# Patient Record
Sex: FEMALE | Race: BLACK OR AFRICAN AMERICAN | Employment: UNEMPLOYED | URBAN - METROPOLITAN AREA
[De-identification: names, ages, dates, MRNs, and addresses within clinical notes are randomized per-mention and may not be internally consistent; named-entity substitution may affect disease eponyms.]

---

## 2017-09-06 ENCOUNTER — APPOINTMENT (OUTPATIENT)
Dept: LAB | Age: 32
End: 2017-09-06
Attending: INTERNAL MEDICINE
Payer: COMMERCIAL

## 2017-09-06 ENCOUNTER — OFFICE VISIT (OUTPATIENT)
Dept: ENDOCRINOLOGY CLINIC | Facility: CLINIC | Age: 32
End: 2017-09-06

## 2017-09-06 VITALS
BODY MASS INDEX: 39.86 KG/M2 | WEIGHT: 248 LBS | HEIGHT: 66 IN | HEART RATE: 99 BPM | DIASTOLIC BLOOD PRESSURE: 76 MMHG | SYSTOLIC BLOOD PRESSURE: 117 MMHG

## 2017-09-06 DIAGNOSIS — E55.9 VITAMIN D DEFICIENCY: ICD-10-CM

## 2017-09-06 DIAGNOSIS — Z13.29 THYROID DISORDER SCREENING: Primary | ICD-10-CM

## 2017-09-06 DIAGNOSIS — Z13.29 THYROID DISORDER SCREENING: ICD-10-CM

## 2017-09-06 LAB
T4 FREE SERPL-MCNC: 1 NG/DL (ref 0.58–1.64)
TSH SERPL-ACNC: 1.16 UIU/ML (ref 0.45–5.33)

## 2017-09-06 PROCEDURE — 99212 OFFICE O/P EST SF 10 MIN: CPT | Performed by: INTERNAL MEDICINE

## 2017-09-06 PROCEDURE — 84443 ASSAY THYROID STIM HORMONE: CPT

## 2017-09-06 PROCEDURE — 36415 COLL VENOUS BLD VENIPUNCTURE: CPT

## 2017-09-06 PROCEDURE — 99203 OFFICE O/P NEW LOW 30 MIN: CPT | Performed by: INTERNAL MEDICINE

## 2017-09-06 PROCEDURE — 84439 ASSAY OF FREE THYROXINE: CPT

## 2017-09-06 PROCEDURE — 82306 VITAMIN D 25 HYDROXY: CPT

## 2017-09-06 NOTE — H&P
New Patient Evaluation - History and Physical    CONSULT - Reason for Visit:  Evaluation for thyroid function    Requesting Physician: Self referral    CHIEF COMPLAINT:  Patient presents with:  Thyroid Problem       HISTORY OF PRESENT ILLNESS:   Ygnacio Dubin cough  Cardiovascular: Negative for:  chest pain, palpitations, orthopnea  GI: Negative for:  abdominal pain, nausea, vomiting, diarrhea, + constipation, bleeding  Neurology: Negative for: headache, numbness, weakness  Genito-Urinary: Negative for: dysuria

## 2017-09-08 ENCOUNTER — TELEPHONE (OUTPATIENT)
Dept: ENDOCRINOLOGY CLINIC | Facility: CLINIC | Age: 32
End: 2017-09-08

## 2017-09-08 DIAGNOSIS — E55.9 VITAMIN D DEFICIENCY: ICD-10-CM

## 2017-09-08 DIAGNOSIS — R53.83 OTHER FATIGUE: Primary | ICD-10-CM

## 2017-09-08 LAB — 25(OH)D3 SERPL-MCNC: 9.8 NG/ML

## 2017-09-08 RX ORDER — LEVOTHYROXINE SODIUM 0.03 MG/1
25 TABLET ORAL
Qty: 90 TABLET | Refills: 0 | Status: SHIPPED | OUTPATIENT
Start: 2017-09-08

## 2017-09-08 RX ORDER — ERGOCALCIFEROL 1.25 MG/1
CAPSULE ORAL
Qty: 16 CAPSULE | Refills: 0 | Status: SHIPPED | OUTPATIENT
Start: 2017-09-08

## 2017-09-08 NOTE — TELEPHONE ENCOUNTER
Please let the patient know:    1. Vit D is very low at 9, should be around 25 This could be making her feel tired. Start ergocalciferol 50,000 IU q week x 12 weeks, then follow with q month. Please order  She should repeat Vit D in three months.  Please

## 2017-09-08 NOTE — TELEPHONE ENCOUNTER
Called Sofia. Informed her of Dr. Keturah Ryder instructions below. She verbalized her understanding of all instructions. Reviewed that she should take LT4 in the morning.  Fasting, 30 min before breakfast,  from vitamins and supplements by at least

## 2017-09-14 ENCOUNTER — OFFICE VISIT (OUTPATIENT)
Dept: OBGYN CLINIC | Facility: CLINIC | Age: 32
End: 2017-09-14

## 2017-09-14 VITALS
SYSTOLIC BLOOD PRESSURE: 126 MMHG | WEIGHT: 244.63 LBS | BODY MASS INDEX: 39.31 KG/M2 | DIASTOLIC BLOOD PRESSURE: 74 MMHG | HEIGHT: 66 IN

## 2017-09-14 DIAGNOSIS — N92.0 MENORRHAGIA WITH REGULAR CYCLE: ICD-10-CM

## 2017-09-14 DIAGNOSIS — Z01.419 ENCOUNTER FOR GYNECOLOGICAL EXAMINATION WITHOUT ABNORMAL FINDING: ICD-10-CM

## 2017-09-14 DIAGNOSIS — Z01.419 WELL WOMAN EXAM WITH ROUTINE GYNECOLOGICAL EXAM: Primary | ICD-10-CM

## 2017-09-14 PROCEDURE — 99385 PREV VISIT NEW AGE 18-39: CPT | Performed by: OBSTETRICS & GYNECOLOGY

## 2017-09-14 RX ORDER — NORGESTIMATE AND ETHINYL ESTRADIOL 7DAYSX3 28
1 KIT ORAL DAILY
Qty: 1 PACKAGE | Refills: 4 | Status: SHIPPED | OUTPATIENT
Start: 2017-09-14 | End: 2017-10-12

## 2017-09-14 NOTE — PROGRESS NOTES
HPI:    Patient ID: Chino Hernandez is a 33year old female. HPI  Patient is here for routine exam.  New to office. Reports heavy but regular menses. No H/O fibroids per patient. Discussed cycling with OCPs and patient desires treatment.   Medication revi warm and dry. Psychiatric: She has a normal mood and affect. Her behavior is normal. Judgment and thought content normal.   Nursing note and vitals reviewed. Pap Done.            ASSESSMENT/PLAN:   Well woman exam with routine gynecological exam  (primar

## 2017-09-17 LAB — HPV I/H RISK 1 DNA SPEC QL NAA+PROBE: NEGATIVE

## 2021-09-15 ENCOUNTER — HOSPITAL ENCOUNTER (EMERGENCY)
Facility: HOSPITAL | Age: 36
Discharge: HOME | End: 2021-09-15
Attending: EMERGENCY MEDICINE
Payer: MEDICAID

## 2021-09-15 VITALS
WEIGHT: 198 LBS | SYSTOLIC BLOOD PRESSURE: 125 MMHG | HEART RATE: 76 BPM | RESPIRATION RATE: 16 BRPM | TEMPERATURE: 98 F | HEIGHT: 66 IN | BODY MASS INDEX: 31.82 KG/M2 | OXYGEN SATURATION: 100 % | DIASTOLIC BLOOD PRESSURE: 82 MMHG

## 2021-09-15 DIAGNOSIS — Z20.822 CLOSE EXPOSURE TO COVID-19 VIRUS: Primary | ICD-10-CM

## 2021-09-15 LAB — SARS-COV-2 RNA RESP QL NAA+PROBE: NOT DETECTED

## 2021-09-15 PROCEDURE — 99283 EMERGENCY DEPT VISIT LOW MDM: CPT

## 2021-09-15 NOTE — ED INITIAL ASSESSMENT (HPI)
Patient to Er from home with request for covid testing. Patient states her boyfriend tested positive today. Patient denies symptoms.

## 2021-09-15 NOTE — ED PROVIDER NOTES
Patient Seen in: [facility] AND [facility] Emergency Department      History   Patient presents with:  Testing    Stated Complaint: Testing    Subjective:   HPI    81-year-old female with history of thyroid disorder, here requesting COVID-19 testing.   Her husban SpO2 100%   BMI 31.96 kg/m²         Physical Exam  Vitals and nursing note reviewed. HENT:      Head: Normocephalic. Mouth/Throat:      Mouth: Mucous membranes are moist.   Eyes:      Extraocular Movements: Extraocular movements intact.    Cardiovasc chills, vomiting, pt expresses understanding and agrees to d/c instructions    EMERGENCY DEPARTMENT MEDICAL DECISION MAKING:  After obtaining the patient's history, performing the physical exam and reviewing the diagnostics, multiple initial diagnoses were

## 2021-09-19 ENCOUNTER — HOSPITAL ENCOUNTER (EMERGENCY)
Facility: HOSPITAL | Age: 36
Discharge: HOME | End: 2021-09-19
Payer: MEDICAID

## 2021-09-19 VITALS
HEART RATE: 76 BPM | OXYGEN SATURATION: 97 % | BODY MASS INDEX: 30.53 KG/M2 | RESPIRATION RATE: 19 BRPM | TEMPERATURE: 99 F | HEIGHT: 66 IN | WEIGHT: 190 LBS | SYSTOLIC BLOOD PRESSURE: 100 MMHG | DIASTOLIC BLOOD PRESSURE: 68 MMHG

## 2021-09-19 DIAGNOSIS — U07.1 COVID-19: Primary | ICD-10-CM

## 2021-09-19 LAB — SARS-COV-2 RNA RESP QL NAA+PROBE: DETECTED

## 2021-09-19 PROCEDURE — 96361 HYDRATE IV INFUSION ADD-ON: CPT

## 2021-09-19 PROCEDURE — 96374 THER/PROPH/DIAG INJ IV PUSH: CPT

## 2021-09-19 PROCEDURE — 99284 EMERGENCY DEPT VISIT MOD MDM: CPT

## 2021-09-19 RX ORDER — KETOROLAC TROMETHAMINE 30 MG/ML
30 INJECTION, SOLUTION INTRAMUSCULAR; INTRAVENOUS ONCE
Status: COMPLETED | OUTPATIENT
Start: 2021-09-19 | End: 2021-09-19

## 2021-09-19 NOTE — ED PROVIDER NOTES
Patient Seen in: [facility] AND [facility] Emergency Department      History   Patient presents with:  Covid-19 Test  Cough/URI    Stated Complaint: COVID symptoms     Subjective:   HPI    44-year-old female presents the emergency department for evaluation.  Yue 0116]   /85   Pulse 99   Resp 20   Temp 98.5 °F (36.9 °C)   Temp src Temporal   SpO2 99 %   O2 Device None (Room air)       Current:BP 94/78   Pulse 80   Temp 99.1 °F (37.3 °C) (Oral)   Resp 24   Ht 167.6 cm (5' 6\")   Wt 86.2 kg   LMP 09/13/2021 (Ex All other components within normal limits                   MDM      Covid positive today. Has has received one vaccine this far.  She was encouraged to discuss when she should receive the 2nd vaccine with her primary as she is now Covid positive and is

## 2021-09-19 NOTE — ED INITIAL ASSESSMENT (HPI)
Pt presents to ED for nasal congestion, cough and states that her chest feels like it is \"burining like fire\". Pt has known COVID exposure.  Pt requesting covid test.

## 2021-09-19 NOTE — ED QUICK NOTES
Patient monitored post infusion. No symptoms of reaction noted. Patient prepared for dc home. Pt educated on quarantine and when to return if symptoms worsen. Pt dc instable condition.

## 2021-09-19 NOTE — ED QUICK NOTES
Pt to the ed for coivd symptoms. Stated she was in close contact with a covid positive person. Pt stated that she has only received one dose of the vaccine. Pt sats stable on room air. Safety precautions in place. Continue to monitor.

## 2024-04-05 ENCOUNTER — APPOINTMENT (OUTPATIENT)
Dept: CT IMAGING | Facility: HOSPITAL | Age: 39
End: 2024-04-05
Attending: STUDENT IN AN ORGANIZED HEALTH CARE EDUCATION/TRAINING PROGRAM
Payer: MEDICAID

## 2024-04-05 ENCOUNTER — HOSPITAL ENCOUNTER (EMERGENCY)
Facility: HOSPITAL | Age: 39
Discharge: HOME | End: 2024-04-05
Attending: STUDENT IN AN ORGANIZED HEALTH CARE EDUCATION/TRAINING PROGRAM
Payer: MEDICAID

## 2024-04-05 VITALS
HEART RATE: 87 BPM | SYSTOLIC BLOOD PRESSURE: 127 MMHG | OXYGEN SATURATION: 100 % | DIASTOLIC BLOOD PRESSURE: 78 MMHG | TEMPERATURE: 98 F | RESPIRATION RATE: 20 BRPM

## 2024-04-05 DIAGNOSIS — J02.9 VIRAL PHARYNGITIS: Primary | ICD-10-CM

## 2024-04-05 LAB
ANION GAP SERPL CALC-SCNC: 4 MMOL/L (ref 0–18)
BASOPHILS # BLD AUTO: 0.04 X10(3) UL (ref 0–0.2)
BASOPHILS NFR BLD AUTO: 0.5 %
BUN BLD-MCNC: 14 MG/DL (ref 9–23)
BUN/CREAT SERPL: 14.7 (ref 10–20)
CALCIUM BLD-MCNC: 9.2 MG/DL (ref 8.7–10.4)
CHLORIDE SERPL-SCNC: 107 MMOL/L (ref 98–112)
CO2 SERPL-SCNC: 28 MMOL/L (ref 21–32)
CREAT BLD-MCNC: 0.95 MG/DL
DEPRECATED RDW RBC AUTO: 43.2 FL (ref 35.1–46.3)
EGFRCR SERPLBLD CKD-EPI 2021: 79 ML/MIN/1.73M2 (ref 60–?)
EOSINOPHIL # BLD AUTO: 0.14 X10(3) UL (ref 0–0.7)
EOSINOPHIL NFR BLD AUTO: 1.8 %
ERYTHROCYTE [DISTWIDTH] IN BLOOD BY AUTOMATED COUNT: 13.4 % (ref 11–15)
FLUAV + FLUBV RNA SPEC NAA+PROBE: NEGATIVE
FLUAV + FLUBV RNA SPEC NAA+PROBE: NEGATIVE
GLUCOSE BLD-MCNC: 95 MG/DL (ref 70–99)
HCT VFR BLD AUTO: 40.4 %
HGB BLD-MCNC: 13.3 G/DL
IMM GRANULOCYTES # BLD AUTO: 0.01 X10(3) UL (ref 0–1)
IMM GRANULOCYTES NFR BLD: 0.1 %
LYMPHOCYTES # BLD AUTO: 2.76 X10(3) UL (ref 1–4)
LYMPHOCYTES NFR BLD AUTO: 36 %
MCH RBC QN AUTO: 28.9 PG (ref 26–34)
MCHC RBC AUTO-ENTMCNC: 32.9 G/DL (ref 31–37)
MCV RBC AUTO: 87.6 FL
MONOCYTES # BLD AUTO: 0.65 X10(3) UL (ref 0.1–1)
MONOCYTES NFR BLD AUTO: 8.5 %
NEUTROPHILS # BLD AUTO: 4.06 X10 (3) UL (ref 1.5–7.7)
NEUTROPHILS # BLD AUTO: 4.06 X10(3) UL (ref 1.5–7.7)
NEUTROPHILS NFR BLD AUTO: 53.1 %
OSMOLALITY SERPL CALC.SUM OF ELEC: 288 MOSM/KG (ref 275–295)
PLATELET # BLD AUTO: 288 10(3)UL (ref 150–450)
POTASSIUM SERPL-SCNC: 4.1 MMOL/L (ref 3.5–5.1)
RBC # BLD AUTO: 4.61 X10(6)UL
RSV RNA SPEC NAA+PROBE: NEGATIVE
S PYO AG THROAT QL: NEGATIVE
SARS-COV-2 RNA RESP QL NAA+PROBE: NOT DETECTED
SODIUM SERPL-SCNC: 139 MMOL/L (ref 136–145)
WBC # BLD AUTO: 7.7 X10(3) UL (ref 4–11)

## 2024-04-05 PROCEDURE — 99284 EMERGENCY DEPT VISIT MOD MDM: CPT

## 2024-04-05 PROCEDURE — 99285 EMERGENCY DEPT VISIT HI MDM: CPT

## 2024-04-05 PROCEDURE — 87880 STREP A ASSAY W/OPTIC: CPT

## 2024-04-05 PROCEDURE — 80048 BASIC METABOLIC PNL TOTAL CA: CPT | Performed by: STUDENT IN AN ORGANIZED HEALTH CARE EDUCATION/TRAINING PROGRAM

## 2024-04-05 PROCEDURE — 96374 THER/PROPH/DIAG INJ IV PUSH: CPT

## 2024-04-05 PROCEDURE — 0241U SARS-COV-2/FLU A AND B/RSV BY PCR (GENEXPERT): CPT | Performed by: STUDENT IN AN ORGANIZED HEALTH CARE EDUCATION/TRAINING PROGRAM

## 2024-04-05 PROCEDURE — 0241U SARS-COV-2/FLU A AND B/RSV BY PCR (GENEXPERT): CPT

## 2024-04-05 PROCEDURE — 85025 COMPLETE CBC W/AUTO DIFF WBC: CPT | Performed by: STUDENT IN AN ORGANIZED HEALTH CARE EDUCATION/TRAINING PROGRAM

## 2024-04-05 PROCEDURE — 96375 TX/PRO/DX INJ NEW DRUG ADDON: CPT

## 2024-04-05 PROCEDURE — 70491 CT SOFT TISSUE NECK W/DYE: CPT | Performed by: STUDENT IN AN ORGANIZED HEALTH CARE EDUCATION/TRAINING PROGRAM

## 2024-04-05 RX ORDER — MORPHINE SULFATE 2 MG/ML
2 INJECTION, SOLUTION INTRAMUSCULAR; INTRAVENOUS ONCE
Status: COMPLETED | OUTPATIENT
Start: 2024-04-05 | End: 2024-04-05

## 2024-04-05 RX ORDER — DEXAMETHASONE SODIUM PHOSPHATE 4 MG/ML
10 VIAL (ML) INJECTION ONCE
Status: COMPLETED | OUTPATIENT
Start: 2024-04-05 | End: 2024-04-05

## 2024-04-05 RX ORDER — KETOROLAC TROMETHAMINE 15 MG/ML
15 INJECTION, SOLUTION INTRAMUSCULAR; INTRAVENOUS ONCE
Status: COMPLETED | OUTPATIENT
Start: 2024-04-05 | End: 2024-04-05

## 2024-04-05 RX ORDER — NAPROXEN 500 MG/1
500 TABLET ORAL 2 TIMES DAILY PRN
Qty: 10 TABLET | Refills: 0 | Status: SHIPPED | OUTPATIENT
Start: 2024-04-05 | End: 2024-04-05 | Stop reason: RX

## 2024-04-05 RX ORDER — NAPROXEN 500 MG/1
500 TABLET ORAL 2 TIMES DAILY PRN
Qty: 10 TABLET | Refills: 0 | Status: SHIPPED | OUTPATIENT
Start: 2024-04-05 | End: 2024-04-10

## 2024-04-06 NOTE — ED PROVIDER NOTES
[facility] Emergency Department Note  Patient: Sofia Awad Age: 39 year old Sex: female      MRN: [id number]  : 3/27/1985    Patient Seen in: [facility] Emergency Department    History     Chief Complaint   Patient presents with    Sore Throat     Stated Complaint: Sore Throat    History obtained from: Patient    Patient is a 39-year-old female with past medical history of hypothyroid, SHAYY on nightly CPAP status post tonsillectomy and remote history of laser tongue reduction surgery, GERD, morbid obesity presenting today for evaluation of 2 days of sore throat.  She states that she has been having bilateral ear aches, body aches over the past 3 days.  Yesterday, developed a sore throat.  States that today, she is having pain on swallowing.  No shortness of breath or cough.  No fevers or chills.    Review of Systems:  Review of Systems  Positive for stated complaint: Sore Throat. Constitutional and vital signs reviewed. All other systems reviewed and negative except as noted above.    Patient History:  Past Medical History:   Diagnosis Date    Hyperthyroidism     Hypothyroidism     Sleep apnea        Past Surgical History:   Procedure Laterality Date    COLONOSCOPY          Family History   Problem Relation Age of Onset    Hypertension Father     Lipids Mother     Thyroid disease Paternal Grandmother        Specific Social Determinants of Health:   Social History     Socioeconomic History    Marital status: Single   Tobacco Use    Smoking status: Never    Smokeless tobacco: Never   Substance and Sexual Activity    Alcohol use: Yes     Alcohol/week: 1.0 standard drink of alcohol     Types: 1 Glasses of wine per week     Comment: social    Drug use: No           PSFH elements reviewed from today and agreed except as otherwise stated in HPI.    Physical Exam     ED Triage Vitals [24 1908]   /76   Pulse 93   Resp 18   Temp 98.3 °F (36.8 °C)   Temp src Temporal   SpO2 97 %   O2 Device None  (Room air)       Current:/78   Pulse 87   Temp 98.3 °F (36.8 °C) (Temporal)   Resp 20   LMP 03/26/2024 (Approximate)   SpO2 100%         Physical Exam  Constitutional:       General: She is not in acute distress.  HENT:      Head: Normocephalic and atraumatic.      Mouth/Throat:      Mouth: Mucous membranes are moist.      Pharynx: No pharyngeal swelling, oropharyngeal exudate or posterior oropharyngeal erythema.      Comments: Postsurgical changes of the posterior pharynx with asymmetry consistent with patient's baseline  Eyes:      Extraocular Movements: Extraocular movements intact.   Cardiovascular:      Rate and Rhythm: Normal rate and regular rhythm.      Heart sounds: Normal heart sounds.   Pulmonary:      Effort: Pulmonary effort is normal. No respiratory distress.      Breath sounds: Normal breath sounds.   Abdominal:      Palpations: Abdomen is soft.      Tenderness: There is no abdominal tenderness.   Skin:     General: Skin is warm and dry.      Capillary Refill: Capillary refill takes less than 2 seconds.      Findings: No rash.   Neurological:      General: No focal deficit present.      Mental Status: She is alert and oriented to person, place, and time.   Psychiatric:         Mood and Affect: Mood normal.         Behavior: Behavior normal.         ED Course   Labs:   Labs Reviewed   BASIC METABOLIC PANEL (8) - Normal   POCT RAPID STREP - Normal   SARS-COV-2/FLU A AND B/RSV BY PCR (GENEXPERT) - Normal    Narrative:     This test is intended for the qualitative detection and differentiation of SARS-CoV-2, influenza A, influenza B, and respiratory syncytial virus (RSV) viral RNA in nasopharyngeal or nares swabs from individuals suspected of respiratory viral infection consistent with COVID-19 by their healthcare provider. Signs and symptoms of respiratory viral infection due to SARS-CoV-2, influenza, and RSV can be similar.    Test performed using the [employer] Xpress SARS-CoV-2/FLU/RSV (real  time RT-PCR)  assay on the GeneXpert instrument, [employer], [place] [address].   This test is being used under the Food and Drug Administration's Emergency Use Authorization.    The authorized Fact Sheet for Healthcare Providers for this assay is available upon request from the laboratory.   CBC WITH DIFFERENTIAL WITH PLATELET    Narrative:     The following orders were created for panel order CBC With Differential With Platelet.  Procedure                               Abnormality         Status                     ---------                               -----------         ------                     CBC W/ DIFFERENTIAL[[id number]]                              Final result                 Please view results for these tests on the individual orders.   CBC W/ DIFFERENTIAL     Radiology findings:  I personally reviewed the images.   No results found.    CT neck with contrast      IMPRESSION:    No definite peritonsillar abscess or retropharyngeal fluid.  The epiglottis appears unremarkable.    There are slightly prominent jugulodigastric lymph nodes bilaterally measuring up to 1.0 cm in short axis.    There is diffuse enhancement and nodularity of the thyroid gland, especially the isthmus and left thyroid lobe.Consider nonemergent ultrasound for further evaluation.        Cardiac Monitor: Interpreted by me.   Pulse Readings from Last 1 Encounters:   04/05/24 87   , sinus,     External non-ED records reviewed independently by me: PCP note from 3/28/2024 reviewed confirming patient has a past medical history of SHAYY for which she received laser tongue resection    MDM   39-year-old female with past medical history of hypothyroidism and SHAYY status post laser tongue resection and tonsillectomy presented today for evaluation of sore throat, and bilateral ear pain, and bodyaches over the past 3 days.  Upon arrival to emergency department, she is afebrile.  Vitals are within normal limits.  Pharynx with postsurgical changes  but no erythema, exudates or edema.    Differential diagnoses considered includes, but is not limited to: Viral URI, viral syndrome, retropharyngeal abscess, peritonsillar abscess,, bacterial pharyngitis    Will obtain the following tests: Rapid strep, COVID/flu/RSV panel, CBC, BMP, CT neck soft tissue with IV contrast  Please see ED course for my independent review of these tests/imaging results.    Initial Medications/Therapeutics administered: Toradol, Decadron, morphine    Chronic conditions affecting care: Hypothyroidism, SHAYY status post laser, resection tonsillectomy    Workup and medications considered but not ordered: None    Social Determinants of Health that impacted care: None      ED course: Labs reassuring.  Rapid strep negative.  COVID/flu/RSV negative.  I independently reviewed the CT neck images that show no evidence of deep space infection.  Patient without evidence of respiratory distress.  Is tolerating p.o. intake.  Stable for discharge home at this time with continued Tylenol and Motrin as needed for pain for likely viral syndrome.  Return precautions provided and all questions answered.  Patient expressed understanding and agreement with this plan.        Disposition and Plan     Clinical Impression:  1. Viral pharyngitis        Disposition:  Discharge    Follow-up:  Nonstaff, Physician  [address] [facility] [address]    Schedule an appointment as soon as possible for a visit in 2 day(s)  As needed, If symptoms worsen      Medications Prescribed:  Discharge Medication List as of 4/5/2024 10:42 PM        START taking these medications    Details   naproxen 500 MG Oral Tab Take 1 tablet (500 mg total) by mouth 2 (two) times daily as needed., Normal, Disp-10 tablet, R-0               This note may have been created using voice dictation technology and may include inadvertent errors.      Patsy Skinner MD  Emergency Medicine

## 2024-04-06 NOTE — ED INITIAL ASSESSMENT (HPI)
Patient presents to ED with sore throat that started yesterday, patient is also complaining of body aches, eye drainage, and bilateral ear pain

## (undated) NOTE — LETTER
9/19/2017              Ruth Ann Vick        [address] [facility]         Dear Julio Johnson,    It was a pleasure to see you. Your PAP test was normal.  There is no need for further testing at this time.   I look forward to seeing you at your ne